# Patient Record
Sex: FEMALE | Race: WHITE | NOT HISPANIC OR LATINO
[De-identification: names, ages, dates, MRNs, and addresses within clinical notes are randomized per-mention and may not be internally consistent; named-entity substitution may affect disease eponyms.]

---

## 2023-11-15 PROBLEM — Z00.00 ENCOUNTER FOR PREVENTIVE HEALTH EXAMINATION: Status: ACTIVE | Noted: 2023-11-15

## 2023-11-29 ENCOUNTER — APPOINTMENT (OUTPATIENT)
Dept: ORTHOPEDIC SURGERY | Facility: CLINIC | Age: 43
End: 2023-11-29
Payer: OTHER MISCELLANEOUS

## 2023-11-29 VITALS — HEIGHT: 64 IN | BODY MASS INDEX: 19.63 KG/M2 | WEIGHT: 115 LBS

## 2023-11-29 DIAGNOSIS — Z78.9 OTHER SPECIFIED HEALTH STATUS: ICD-10-CM

## 2023-11-29 DIAGNOSIS — S16.1XXA STRAIN OF MUSCLE, FASCIA AND TENDON AT NECK LEVEL, INITIAL ENCOUNTER: ICD-10-CM

## 2023-11-29 PROCEDURE — 72070 X-RAY EXAM THORAC SPINE 2VWS: CPT

## 2023-11-29 PROCEDURE — 72040 X-RAY EXAM NECK SPINE 2-3 VW: CPT

## 2023-11-29 PROCEDURE — 99204 OFFICE O/P NEW MOD 45 MIN: CPT

## 2023-12-22 ENCOUNTER — APPOINTMENT (OUTPATIENT)
Dept: ORTHOPEDIC SURGERY | Facility: CLINIC | Age: 43
End: 2023-12-22

## 2024-01-03 ENCOUNTER — APPOINTMENT (OUTPATIENT)
Dept: ORTHOPEDIC SURGERY | Facility: CLINIC | Age: 44
End: 2024-01-03
Payer: OTHER MISCELLANEOUS

## 2024-01-03 VITALS — WEIGHT: 115 LBS | HEIGHT: 64 IN | BODY MASS INDEX: 19.63 KG/M2

## 2024-01-03 PROCEDURE — 99214 OFFICE O/P EST MOD 30 MIN: CPT

## 2024-01-07 NOTE — DATA REVIEWED
[I independently reviewed and interpreted images and report] : I independently reviewed and interpreted images and report [MRI] : MRI [Cervical Spine] : cervical spine [Report was reviewed and noted in the chart] : The report was reviewed and noted in the chart [I reviewed the films/CD and additionally noted] : I reviewed the films/CD and additionally noted [FreeTextEntry1] : I stop paperwork reviewed

## 2024-01-07 NOTE — DISCUSSION/SUMMARY
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  I am referring the patient to pain management to discuss trying ASH for pain relief.  Will remain conservative at this time. Discussed More urgency for surgery (ACDF C5/6) if there is onset of motor or neurological deficits. The patient will start upcoming physical therapy to address cervical/scapulothoracic and upper extremity. The cause of the pain in the 5th digit is attributed to inflammation of the interphalangeal joint, not the cervical spine. Advised patient to be cognisant of dangerous situations to avoid any trauma to the head. Advised patient to look out for any new or worsening symptoms. Follow up in 4 weeks.   Prior to appointment and during encounter with patient extensive medical records were reviewed including but not limited to, hospital records, outpatient records, imaging results, and lab data.During this appointment the patient was examined, diagnoses were discussed and explained in a face to face manner. In addition extensive time was spent reviewing aforementioned diagnostic studies. Counseling including abnormal image results, differential diagnoses, treatment options, risk and benefits, lifestyle changes, current condition, and current medications was performed. Patient's comments, questions, and concerns were addressed and patient verbalized understanding. Based on this patient's presentation at our office, which is an orthopedic spine surgeon's office, this patient inherently / intrinsically has a risk, however minute, of developing issues such as Cauda equina syndrome, bowel and bladder changes, or progression of motor or neurological deficits such as paralysis which may be permanent.  COURTNEY ALICIA Acting as a Scribe for Dr. Levi HANKS, Courtney Alicia, attest that this documentation has been prepared under the direction and in the presence of Provider Ronny Sims MD.

## 2024-01-07 NOTE — PHYSICAL EXAM
[Normal Coordination] : normal coordination [Normal DTR UE/LE] : normal DTR UE/LE  [Normal Sensation] : normal sensation [Normal Mood and Affect] : normal mood and affect [Orientated] : orientated [Able to Communicate] : able to communicate [Normal Skin] : normal skin [No Rash] : no rash [No Ulcers] : no ulcers [No Lesions] : no lesions [No obvious lymphadenopathy in areas examined] : no obvious lymphadenopathy in areas examined [Well Developed] : well developed [Peripheral vascular exam is grossly normal] : peripheral vascular exam is grossly normal [No Respiratory Distress] : no respiratory distress [Flexion] : flexion [Extension] : extension [Rotation to left] : rotation to left [Rotation to right] : rotation to right [] : sensation of right upper extremities not intact [de-identified] : reflexes brisk in the upper extremities (triceps and brachial radialis)

## 2024-01-07 NOTE — HISTORY OF PRESENT ILLNESS
[de-identified] : 01/03/2024 - The patient is here for an MRI review. She continues to have ongoing neck/upper back pain and tingling sensations in the bilateral arms L > R but now expresses acute pain and tenderness in the bilateral 5th digits, with the left side more pronounced than the right. She is scheduled to commence physical therapy this coming Monday. She denies upper extremity weakness. Denies dexterity changes or gait disturbances. She is not taking medication for pain. She is working full duty.   11/29/2023 - 43-year-old female is here for the initial assessment of neck and scapula pain, along with tingling in the hands and fingers, which started after a work-related incident on November 2, 2023. 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 Although she felt pain immediately, it was not initially severe. Following a visit to the walk-in clinic 1-2 weeks ago, as her pain did not improve, an orthopedist follow-up was recommended. No medications were prescribed, and she has been taking Tylenol. The patient reports experiencing neck and upper back pain, as well as tingling in the hands. There is no pain radiating into the arms, but she describes a sensation of heaviness in the arms. Subjectively, there is a perceived loss of upper extremity strength. Her chief complaint is the tingling sensation in the hands and digits. She denies any change in dexterity and reports no alterations in lower extremity strength. She has been actively working.   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

## 2024-01-15 ENCOUNTER — APPOINTMENT (OUTPATIENT)
Dept: ORTHOPEDIC SURGERY | Facility: CLINIC | Age: 44
End: 2024-01-15
Payer: OTHER MISCELLANEOUS

## 2024-01-15 DIAGNOSIS — M79.646 PAIN IN UNSPECIFIED FINGER(S): ICD-10-CM

## 2024-01-15 PROCEDURE — 73130 X-RAY EXAM OF HAND: CPT | Mod: 50

## 2024-01-15 PROCEDURE — 99214 OFFICE O/P EST MOD 30 MIN: CPT

## 2024-01-15 NOTE — ASSESSMENT
[FreeTextEntry1] : EXAM Right small finger with +ttp at DIP, swelling, no erythema. Able to flex and extend at MCP, PIP and DIP. Sensation slightly decreased in small finger. -tinel at carpal/cubital, -phalen. <2sec cap refill.  Right hand radiographs with no fracture nor dislocation. DIP arthrosis, small finger. Carpus aligned. (3-view)   Left small finger with +ttp at DIP, swelling, no erythema. Able to flex and extend at MCP, PIP and DIP. Sensation slightly decreased in small finger. -tinel at carpal/cubital, -phalen. <2sec cap refill.  Left hand radiographs with no fracture nor dislocation. DIP arthrosis, small finger. Carpus aligned. (3-view)   ASSESSMENT/PLAN Bilateral small finger DIP arthrosis - reviewed radiographs and pathoanatomy with patient. Discussed management to consist of NSAIDs prn, OT prn, finger sleeves and fusion.  F/u prn

## 2024-01-15 NOTE — HISTORY OF PRESENT ILLNESS
[de-identified] : Age: 43 PMHx:  Hand Dominance: RHD Chief Complaint: B/L hands. States she went to Dr Curry for her neck because she had radiating pain and numbness in her hands, he referred her here. Reports her small fingers are numb/throbbing and turning in. States she has had left hand sx in the past (doesn't know what kind). Trauma:11/02/2023, he was trying to  a special needs child and he fell and she jerked her body downward.  Outside Imaging/Treatment: N/A OTC Medications: no OT/PT: no Bracing: no Pain worse with: movement Pain better with: N/A

## 2024-01-29 ENCOUNTER — APPOINTMENT (OUTPATIENT)
Dept: ORTHOPEDIC SURGERY | Facility: CLINIC | Age: 44
End: 2024-01-29

## 2024-02-20 ENCOUNTER — APPOINTMENT (OUTPATIENT)
Dept: PAIN MANAGEMENT | Facility: CLINIC | Age: 44
End: 2024-02-20

## 2024-02-22 ENCOUNTER — APPOINTMENT (OUTPATIENT)
Dept: PAIN MANAGEMENT | Facility: CLINIC | Age: 44
End: 2024-02-22

## 2024-05-17 ENCOUNTER — APPOINTMENT (OUTPATIENT)
Dept: ORTHOPEDIC SURGERY | Facility: CLINIC | Age: 44
End: 2024-05-17
Payer: OTHER MISCELLANEOUS

## 2024-05-17 VITALS — WEIGHT: 115 LBS | HEIGHT: 64 IN | BODY MASS INDEX: 19.63 KG/M2

## 2024-05-17 DIAGNOSIS — M50.10 CERVICAL DISC DISORDER WITH RADICULOPATHY, UNSPECIFIED CERVICAL REGION: ICD-10-CM

## 2024-05-17 PROCEDURE — 99204 OFFICE O/P NEW MOD 45 MIN: CPT

## 2024-05-17 NOTE — DISCUSSION/SUMMARY
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  I am referring the patient to pain management to discuss trying ASH vs TPI for pain relief.  Referral provided for acupuncture. Continuation of current physical therapy trial.  Will remain conservative at this time. Discussed More urgency for surgery (ACDF C5/6) if there is onset of motor or neurological deficits. The patient Advised patient to be cognisant of dangerous situations to avoid any trauma to the head. Advised patient to look out for any new or worsening symptoms.   Prior to appointment and during encounter with patient extensive medical records were reviewed including but not limited to, hospital records, outpatient records, imaging results, and lab data.During this appointment the patient was examined, diagnoses were discussed and explained in a face to face manner. In addition extensive time was spent reviewing aforementioned diagnostic studies. Counseling including abnormal image results, differential diagnoses, treatment options, risk and benefits, lifestyle changes, current condition, and current medications was performed. Patient's comments, questions, and concerns were addressed and patient verbalized understanding. Based on this patient's presentation at our office, which is an orthopedic spine surgeon's office, this patient inherently / intrinsically has a risk, however minute, of developing issues such as Cauda equina syndrome, bowel and bladder changes, or progression of motor or neurological deficits such as paralysis which may be permanent.  COURTNEY ALICIA Acting as a Scribe for Dr. Levi HANKS, Courtney Alicia, attest that this documentation has been prepared under the direction and in the presence of Provider Ronny Sims MD.

## 2024-05-17 NOTE — PHYSICAL EXAM
[Normal Coordination] : normal coordination [Normal DTR UE/LE] : normal DTR UE/LE  [Normal Sensation] : normal sensation [Normal Mood and Affect] : normal mood and affect [Oriented] : oriented [Able to Communicate] : able to communicate [Normal Skin] : normal skin [No Rash] : no rash [No Ulcers] : no ulcers [No Lesions] : no lesions [No obvious lymphadenopathy in areas examined] : no obvious lymphadenopathy in areas examined [Well Developed] : well developed [Peripheral vascular exam is grossly normal] : peripheral vascular exam is grossly normal [No Respiratory Distress] : no respiratory distress [Flexion] : flexion [Extension] : extension [Rotation to left] : rotation to left [Rotation to right] : rotation to right [] : sensation of right upper extremities intact [Biceps 2+] : biceps 2+ [Triceps 2+] : triceps 2+ [Brachioradialis 2+] : brachioradialis 2+ [de-identified] : reflexes brisk in the upper extremities (triceps and brachial radialis)

## 2024-05-17 NOTE — HISTORY OF PRESENT ILLNESS
[de-identified] : 05/17/2024 - Patient presenting for follow up of ongoing neck/upper back pain. She has been undergoing extensive PT which has been somewhat helpful but she wishes she has made more progress. Considering pain management injections. Also complains of numbness/tingling in the digits and hands. No pain radiating into arms.   01/03/2024 - The patient is here for an MRI review. She continues to have ongoing neck/upper back pain and tingling sensations in the bilateral arms L > R but now expresses acute pain and tenderness in the bilateral 5th digits, with the left side more pronounced than the right. She is scheduled to commence physical therapy this coming Monday. She denies upper extremity weakness. Denies dexterity changes or gait disturbances. She is not taking medication for pain. She is working full duty.   11/29/2023 - 43-year-old female is here for the initial assessment of neck and scapula pain, along with tingling in the hands and fingers, which started after a work-related incident on November 2, 2023. 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 Although she felt pain immediately, it was not initially severe. Following a visit to the walk-in clinic 1-2 weeks ago, as her pain did not improve, an orthopedist follow-up was recommended. No medications were prescribed, and she has been taking Tylenol. The patient reports experiencing neck and upper back pain, as well as tingling in the hands. There is no pain radiating into the arms, but she describes a sensation of heaviness in the arms. Subjectively, there is a perceived loss of upper extremity strength. Her chief complaint is the tingling sensation in the hands and digits. She denies any change in dexterity and reports no alterations in lower extremity strength. She has been actively working.   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

## 2024-06-27 ENCOUNTER — APPOINTMENT (OUTPATIENT)
Dept: PAIN MANAGEMENT | Facility: CLINIC | Age: 44
End: 2024-06-27

## 2024-07-09 ENCOUNTER — APPOINTMENT (OUTPATIENT)
Dept: PAIN MANAGEMENT | Facility: CLINIC | Age: 44
End: 2024-07-09
Payer: OTHER MISCELLANEOUS

## 2024-07-09 VITALS — HEIGHT: 64 IN | BODY MASS INDEX: 19.63 KG/M2 | WEIGHT: 115 LBS

## 2024-07-09 DIAGNOSIS — M50.10 CERVICAL DISC DISORDER WITH RADICULOPATHY, UNSPECIFIED CERVICAL REGION: ICD-10-CM

## 2024-07-09 DIAGNOSIS — M54.12 RADICULOPATHY, CERVICAL REGION: ICD-10-CM

## 2024-07-09 DIAGNOSIS — M79.18 MYALGIA, OTHER SITE: ICD-10-CM

## 2024-07-09 PROCEDURE — 99204 OFFICE O/P NEW MOD 45 MIN: CPT

## 2024-07-12 ENCOUNTER — APPOINTMENT (OUTPATIENT)
Dept: ORTHOPEDIC SURGERY | Facility: CLINIC | Age: 44
End: 2024-07-12

## 2024-09-27 ENCOUNTER — APPOINTMENT (OUTPATIENT)
Dept: PAIN MANAGEMENT | Facility: CLINIC | Age: 44
End: 2024-09-27

## 2024-10-14 ENCOUNTER — NON-APPOINTMENT (OUTPATIENT)
Age: 44
End: 2024-10-14

## 2024-10-18 ENCOUNTER — APPOINTMENT (OUTPATIENT)
Dept: PAIN MANAGEMENT | Facility: CLINIC | Age: 44
End: 2024-10-18
Payer: OTHER MISCELLANEOUS

## 2024-10-18 DIAGNOSIS — M54.12 RADICULOPATHY, CERVICAL REGION: ICD-10-CM

## 2024-10-18 PROCEDURE — 62321 NJX INTERLAMINAR CRV/THRC: CPT

## 2024-10-28 ENCOUNTER — APPOINTMENT (OUTPATIENT)
Dept: ORTHOPEDIC SURGERY | Facility: CLINIC | Age: 44
End: 2024-10-28
Payer: OTHER MISCELLANEOUS

## 2024-10-28 DIAGNOSIS — R20.0 ANESTHESIA OF SKIN: ICD-10-CM

## 2024-10-28 PROCEDURE — 99214 OFFICE O/P EST MOD 30 MIN: CPT

## 2024-11-07 ENCOUNTER — APPOINTMENT (OUTPATIENT)
Dept: PAIN MANAGEMENT | Facility: CLINIC | Age: 44
End: 2024-11-07
Payer: OTHER MISCELLANEOUS

## 2024-11-07 VITALS — WEIGHT: 121 LBS | HEIGHT: 64 IN | BODY MASS INDEX: 20.66 KG/M2

## 2024-11-07 DIAGNOSIS — M50.10 CERVICAL DISC DISORDER WITH RADICULOPATHY, UNSPECIFIED CERVICAL REGION: ICD-10-CM

## 2024-11-07 DIAGNOSIS — M54.12 RADICULOPATHY, CERVICAL REGION: ICD-10-CM

## 2024-11-07 DIAGNOSIS — M79.18 MYALGIA, OTHER SITE: ICD-10-CM

## 2024-11-07 PROCEDURE — 99214 OFFICE O/P EST MOD 30 MIN: CPT

## 2024-11-18 ENCOUNTER — TRANSCRIPTION ENCOUNTER (OUTPATIENT)
Age: 44
End: 2024-11-18

## 2024-11-18 ENCOUNTER — APPOINTMENT (OUTPATIENT)
Dept: ORTHOPEDIC SURGERY | Facility: CLINIC | Age: 44
End: 2024-11-18
Payer: OTHER MISCELLANEOUS

## 2024-11-18 VITALS — BODY MASS INDEX: 20.66 KG/M2 | WEIGHT: 121 LBS | HEIGHT: 64 IN

## 2024-11-18 DIAGNOSIS — M50.10 CERVICAL DISC DISORDER WITH RADICULOPATHY, UNSPECIFIED CERVICAL REGION: ICD-10-CM

## 2024-11-18 PROCEDURE — 99214 OFFICE O/P EST MOD 30 MIN: CPT

## 2025-01-13 ENCOUNTER — APPOINTMENT (OUTPATIENT)
Dept: ORTHOPEDIC SURGERY | Facility: CLINIC | Age: 45
End: 2025-01-13
Payer: OTHER MISCELLANEOUS

## 2025-01-13 VITALS — HEIGHT: 64 IN | WEIGHT: 121 LBS | BODY MASS INDEX: 20.66 KG/M2

## 2025-01-13 DIAGNOSIS — M50.10 CERVICAL DISC DISORDER WITH RADICULOPATHY, UNSPECIFIED CERVICAL REGION: ICD-10-CM

## 2025-01-13 PROCEDURE — 99214 OFFICE O/P EST MOD 30 MIN: CPT

## 2025-01-24 ENCOUNTER — RESULT REVIEW (OUTPATIENT)
Age: 45
End: 2025-01-24

## 2025-08-05 ENCOUNTER — APPOINTMENT (OUTPATIENT)
Dept: DERMATOLOGY | Facility: CLINIC | Age: 45
End: 2025-08-05
Payer: COMMERCIAL

## 2025-08-05 PROCEDURE — 11900 INJECT SKIN LESIONS </W 7: CPT

## 2025-08-05 PROCEDURE — 99204 OFFICE O/P NEW MOD 45 MIN: CPT | Mod: 25
